# Patient Record
Sex: FEMALE | Race: WHITE | NOT HISPANIC OR LATINO | URBAN - METROPOLITAN AREA
[De-identification: names, ages, dates, MRNs, and addresses within clinical notes are randomized per-mention and may not be internally consistent; named-entity substitution may affect disease eponyms.]

---

## 2020-09-22 ENCOUNTER — APPOINTMENT (RX ONLY)
Dept: URBAN - METROPOLITAN AREA CLINIC 43 | Facility: CLINIC | Age: 65
Setting detail: DERMATOLOGY
End: 2020-09-22

## 2020-09-22 DIAGNOSIS — Z85.828 PERSONAL HISTORY OF OTHER MALIGNANT NEOPLASM OF SKIN: ICD-10-CM | Status: STABLE

## 2020-09-22 DIAGNOSIS — Z85.820 PERSONAL HISTORY OF MALIGNANT MELANOMA OF SKIN: ICD-10-CM

## 2020-09-22 DIAGNOSIS — L72.8 OTHER FOLLICULAR CYSTS OF THE SKIN AND SUBCUTANEOUS TISSUE: ICD-10-CM

## 2020-09-22 DIAGNOSIS — L56.5 DISSEMINATED SUPERFICIAL ACTINIC POROKERATOSIS (DSAP): ICD-10-CM

## 2020-09-22 DIAGNOSIS — L82.0 INFLAMED SEBORRHEIC KERATOSIS: ICD-10-CM

## 2020-09-22 PROBLEM — D48.5 NEOPLASM OF UNCERTAIN BEHAVIOR OF SKIN: Status: ACTIVE | Noted: 2020-09-22

## 2020-09-22 PROBLEM — D39.8 NEOPLASM OF UNCERTAIN BEHAVIOR OF OTHER SPECIFIED FEMALE GENITAL ORGANS: Status: ACTIVE | Noted: 2020-09-22

## 2020-09-22 PROBLEM — L30.9 DERMATITIS, UNSPECIFIED: Status: ACTIVE | Noted: 2020-09-22

## 2020-09-22 PROCEDURE — 11102 TANGNTL BX SKIN SINGLE LES: CPT | Mod: 59

## 2020-09-22 PROCEDURE — 99203 OFFICE O/P NEW LOW 30 MIN: CPT | Mod: 25

## 2020-09-22 PROCEDURE — ? BIOPSY BY SHAVE METHOD

## 2020-09-22 PROCEDURE — 56605 BIOPSY OF VULVA/PERINEUM: CPT

## 2020-09-22 PROCEDURE — ? TREATMENT REGIMEN

## 2020-09-22 PROCEDURE — 11103 TANGNTL BX SKIN EA SEP/ADDL: CPT | Mod: 59

## 2020-09-22 PROCEDURE — ? COUNSELING

## 2020-09-22 ASSESSMENT — LOCATION SIMPLE DESCRIPTION DERM
LOCATION SIMPLE: RIGHT FOREARM
LOCATION SIMPLE: NOSE
LOCATION SIMPLE: LEFT PRETIBIAL REGION
LOCATION SIMPLE: RIGHT FOREHEAD
LOCATION SIMPLE: LEFT FOREARM
LOCATION SIMPLE: LEFT ZYGOMA
LOCATION SIMPLE: RIGHT PRETIBIAL REGION

## 2020-09-22 ASSESSMENT — LOCATION DETAILED DESCRIPTION DERM
LOCATION DETAILED: LEFT DISTAL PRETIBIAL REGION
LOCATION DETAILED: RIGHT FOREHEAD
LOCATION DETAILED: NASAL TIP
LOCATION DETAILED: LEFT CENTRAL ZYGOMA
LOCATION DETAILED: RIGHT PROXIMAL DORSAL FOREARM
LOCATION DETAILED: RIGHT DISTAL PRETIBIAL REGION
LOCATION DETAILED: LEFT PROXIMAL DORSAL FOREARM

## 2020-09-22 ASSESSMENT — LOCATION ZONE DERM
LOCATION ZONE: LEG
LOCATION ZONE: FACE
LOCATION ZONE: NOSE
LOCATION ZONE: ARM

## 2020-09-22 NOTE — PROCEDURE: TREATMENT REGIMEN
Detail Level: Zone
Plan: Contact office if you would like a prescription for the compounded cream to treat these areas
Plan: Inflamed cyst versus enlarged lymph node \\nWill obtain ultrasound (paper order slip for U/S given to patient in clinic today)\\n
Plan: History of invasive melanoma; depth & stage unknown \\nTreated with left labia minora surgical excision \\nNow has cystic mass of left inguinal crease - ultrasound ordered as noted above \\nNew pigmentation on right labia biopsied today\\nEncouraged annual follow up with full skin/genital examination
Plan: history of morpheaform bcc nasal tip, treated with Mohs in 2014\\nno evidence of persistence/recurrence on exam today\\ncontinue annual checks \\n

## 2020-09-22 NOTE — PROCEDURE: BIOPSY BY SHAVE METHOD
Detail Level: Detailed
Depth Of Biopsy: dermis
Was A Bandage Applied: Yes
Size Of Lesion In Cm: 0
Accession #: [id number]
Biopsy Type: H and E
Biopsy Method: double edge Personna blade
Anesthesia Type: 1% lidocaine with epinephrine
Anesthesia Volume In Cc: 0.5
Hemostasis: Electrocautery
Wound Care: Petrolatum
Dressing: bandage
Destruction After The Procedure: No
Type Of Destruction Used: Electrodesiccation and Curettage
Curettage Text: The wound bed was treated with curettage after the biopsy was performed.
Cryotherapy Text: The wound bed was treated with cryotherapy after the biopsy was performed.
Electrodesiccation Text: The wound bed was treated with electrodesiccation after the biopsy was performed.
Electrodesiccation And Curettage Text: The wound bed was treated with electrodesiccation and curettage after the biopsy was performed.
Silver Nitrate Text: The wound bed was treated with silver nitrate after the biopsy was performed.
Lab: [id number]
Lab Facility: [id number]
Consent: Written consent was obtained and risks were reviewed including but not limited to scarring, infection, bleeding, scabbing, incomplete removal, nerve damage and allergy to anesthesia.
Post-Care Instructions: I reviewed with the patient in detail post-care instructions. Patient is to keep the biopsy site dry overnight, and then apply vaseline ointment twice daily until healed. Patient may apply hydrogen peroxide soaks to remove any crusting.
Notification Instructions: Patient will be notified of biopsy results. However, patient instructed to call the office if not contacted within 2 weeks.
Billing Type: Third-Party Bill
Information: Selecting Yes will display possible errors in your note based on the variables you have selected. This validation is only offered as a suggestion for you. PLEASE NOTE THAT THE VALIDATION TEXT WILL BE REMOVED WHEN YOU FINALIZE YOUR NOTE. IF YOU WANT TO FAX A PRELIMINARY NOTE YOU WILL NEED TO TOGGLE THIS TO 'NO' IF YOU DO NOT WANT IT IN YOUR FAXED NOTE.
Body Location Override (Optional - Billing Will Still Be Based On Selected Body Map Location If Applicable): right labia minora
Accession #: [id number]
Biopsy Method: scissors
Hemostasis: Electrodesiccation
Accession #: [id number]

## 2020-09-22 NOTE — HPI: EVALUATION OF SKIN LESION(S)
What Type Of Note Output Would You Prefer (Optional)?: Standard Output
How Severe Are Your Spot(S)?: mild
Have Your Spot(S) Been Treated In The Past?: has not been treated
Hpi Title: Evaluation of Skin Lesions
Additional History: TBSE ,  2 spots of concern R forehead left temple area.
Family Member: Mother
Location: R arm and vulva
Year Removed: 2006/2007

## 2021-09-17 ENCOUNTER — APPOINTMENT (RX ONLY)
Dept: URBAN - METROPOLITAN AREA CLINIC 43 | Facility: CLINIC | Age: 66
Setting detail: DERMATOLOGY
End: 2021-09-17

## 2021-09-17 DIAGNOSIS — L57.8 OTHER SKIN CHANGES DUE TO CHRONIC EXPOSURE TO NONIONIZING RADIATION: ICD-10-CM

## 2021-09-17 DIAGNOSIS — L82.0 INFLAMED SEBORRHEIC KERATOSIS: ICD-10-CM

## 2021-09-17 DIAGNOSIS — Z85.820 PERSONAL HISTORY OF MALIGNANT MELANOMA OF SKIN: ICD-10-CM

## 2021-09-17 PROCEDURE — ? COUNSELING

## 2021-09-17 PROCEDURE — 11102 TANGNTL BX SKIN SINGLE LES: CPT

## 2021-09-17 PROCEDURE — 11103 TANGNTL BX SKIN EA SEP/ADDL: CPT

## 2021-09-17 PROCEDURE — ? TREATMENT REGIMEN

## 2021-09-17 PROCEDURE — 99212 OFFICE O/P EST SF 10 MIN: CPT | Mod: 25

## 2021-09-17 PROCEDURE — ? BIOPSY BY SHAVE METHOD

## 2021-09-17 ASSESSMENT — LOCATION SIMPLE DESCRIPTION DERM
LOCATION SIMPLE: ABDOMEN
LOCATION SIMPLE: LEFT THIGH

## 2021-09-17 ASSESSMENT — LOCATION ZONE DERM
LOCATION ZONE: TRUNK
LOCATION ZONE: LEG

## 2021-09-17 ASSESSMENT — LOCATION DETAILED DESCRIPTION DERM
LOCATION DETAILED: LEFT ANTERIOR PROXIMAL THIGH
LOCATION DETAILED: LEFT RIB CAGE

## 2021-09-17 NOTE — PROCEDURE: TREATMENT REGIMEN
Detail Level: Zone
Plan: History of vulvar melanoma x 2\\n- left vulvar melanoma depth, stage unknown, year unknown, s/p left vulvectomy \\n- right vulvar melanoma 1.3mm pT2 9/2020; treated with vulvectomy and required multiple 2 subsequent surgeries to clear +MIS at margins \\n- Dr. Sharpe closely following right vulvar area \\n\\nDiscussed follow up examination schedule; she prefers to follow frequently (we discussed that q3mo checks are advised at the minimum for the first 2 years following melanoma diagnosis) with Dr. Sharpe and see me for a full skin check annually\\nScheduled her for 9/2022 \\nEncouraged her to let me know if she has any new/changing lesions of concern

## 2021-09-17 NOTE — PROCEDURE: BIOPSY BY SHAVE METHOD
Detail Level: Detailed
Depth Of Biopsy: dermis
Was A Bandage Applied: Yes
Size Of Lesion In Cm: 0
Accession #: [id number]
Biopsy Type: H and E
Biopsy Method: Personna blade
Anesthesia Type: 1% lidocaine without epinephrine
Anesthesia Volume In Cc: 0.5
Hemostasis: Electrocautery
Wound Care: Petrolatum
Dressing: bandage
Destruction After The Procedure: No
Type Of Destruction Used: Electrodesiccation and Curettage
Curettage Text: The wound bed was treated with curettage after the biopsy was performed.
Cryotherapy Text: The wound bed was treated with cryotherapy after the biopsy was performed.
Electrodesiccation Text: The wound bed was treated with electrodesiccation after the biopsy was performed.
Electrodesiccation And Curettage Text: The wound bed was treated with electrodesiccation and curettage after the biopsy was performed.
Silver Nitrate Text: The wound bed was treated with silver nitrate after the biopsy was performed.
Lab Facility: [id number]
Consent: Written consent was obtained and risks were reviewed including but not limited to scarring, infection, bleeding, scabbing, incomplete removal, nerve damage and allergy to anesthesia.
Post-Care Instructions: I reviewed with the patient in detail post-care instructions. Patient is to keep the biopsy site dry overnight, and then apply vaseline ointment twice daily until healed. Patient may apply hydrogen peroxide soaks to remove any crusting.
Notification Instructions: Patient will be notified of biopsy results. However, patient instructed to call the office if not contacted within 2 weeks.
Billing Type: Third-Party Bill
Information: Selecting Yes will display possible errors in your note based on the variables you have selected. This validation is only offered as a suggestion for you. PLEASE NOTE THAT THE VALIDATION TEXT WILL BE REMOVED WHEN YOU FINALIZE YOUR NOTE. IF YOU WANT TO FAX A PRELIMINARY NOTE YOU WILL NEED TO TOGGLE THIS TO 'NO' IF YOU DO NOT WANT IT IN YOUR FAXED NOTE.
Accession #: [id number]

## 2021-09-17 NOTE — HPI: EVALUATION OF SKIN LESION(S)
What Type Of Note Output Would You Prefer (Optional)?: Standard Output
How Severe Are Your Spot(S)?: mild
Have Your Spot(S) Been Treated In The Past?: has not been treated
Hpi Title: Evaluation of Skin Lesions
Additional History: Patient is here for TBSE. She is concerned with a lesion on her L upper thigh and under her L breast.
Family Member: Mother

## 2022-09-16 ENCOUNTER — APPOINTMENT (RX ONLY)
Dept: URBAN - METROPOLITAN AREA CLINIC 43 | Facility: CLINIC | Age: 67
Setting detail: DERMATOLOGY
End: 2022-09-16

## 2022-09-16 DIAGNOSIS — Q82.8 OTHER SPECIFIED CONGENITAL MALFORMATIONS OF SKIN: ICD-10-CM

## 2022-09-16 DIAGNOSIS — Z85.820 PERSONAL HISTORY OF MALIGNANT MELANOMA OF SKIN: ICD-10-CM

## 2022-09-16 DIAGNOSIS — L82.1 OTHER SEBORRHEIC KERATOSIS: ICD-10-CM

## 2022-09-16 PROCEDURE — ? COUNSELING

## 2022-09-16 PROCEDURE — ? TREATMENT REGIMEN

## 2022-09-16 PROCEDURE — 99213 OFFICE O/P EST LOW 20 MIN: CPT

## 2022-09-16 ASSESSMENT — LOCATION ZONE DERM
LOCATION ZONE: FACE
LOCATION ZONE: LEG

## 2022-09-16 ASSESSMENT — LOCATION DETAILED DESCRIPTION DERM
LOCATION DETAILED: RIGHT DISTAL PRETIBIAL REGION
LOCATION DETAILED: LEFT INFERIOR CENTRAL MALAR CHEEK

## 2022-09-16 ASSESSMENT — LOCATION SIMPLE DESCRIPTION DERM
LOCATION SIMPLE: RIGHT PRETIBIAL REGION
LOCATION SIMPLE: LEFT CHEEK

## 2022-09-16 NOTE — HPI: EVALUATION OF SKIN LESION(S)
What Type Of Note Output Would You Prefer (Optional)?: Standard Output
How Severe Are Your Spot(S)?: moderate
Have Your Spot(S) Been Treated In The Past?: has not been treated
Hpi Title: Evaluation of Skin Lesions
Location: Vulva & R arm
Year Removed: 2020 & 2021

## 2022-09-16 NOTE — PROCEDURE: COUNSELING
Detail Level: Detailed
Detail Level: Simple
Patient Specific Counseling (Will Not Stick From Patient To Patient): has many flat SKS on arms, legs; reassured benign.  Has a larger plaque of SK on the left cheek, treated today with a test application of LN2 and expected reaction discussed. If unable to satisfactorily destroy with LN2 then would reschedule back for cosmetic electrodessication.

## 2022-09-16 NOTE — PROCEDURE: TREATMENT REGIMEN
Detail Level: Zone
Plan: History of vulvar melanoma x 2\\n- left vulvar melanoma depth, stage unknown, year unknown, s/p left vulvectomy \\n- right vulvar melanoma 1.3mm pT2 9/2020; treated with vulvectomy and required multiple 2 subsequent surgeries to clear +MIS at margins \\n- Dr. Sharpe closely following right vulvar area - has next appointment with her next week \\n- Continue full skin exams annually

## 2023-09-15 ENCOUNTER — APPOINTMENT (RX ONLY)
Dept: URBAN - METROPOLITAN AREA CLINIC 43 | Facility: CLINIC | Age: 68
Setting detail: DERMATOLOGY
End: 2023-09-15

## 2023-09-15 DIAGNOSIS — I78.8 OTHER DISEASES OF CAPILLARIES: ICD-10-CM

## 2023-09-15 DIAGNOSIS — Z85.820 PERSONAL HISTORY OF MALIGNANT MELANOMA OF SKIN: ICD-10-CM

## 2023-09-15 DIAGNOSIS — L82.1 OTHER SEBORRHEIC KERATOSIS: ICD-10-CM

## 2023-09-15 PROBLEM — D23.39 OTHER BENIGN NEOPLASM OF SKIN OF OTHER PARTS OF FACE: Status: ACTIVE | Noted: 2023-09-15

## 2023-09-15 PROCEDURE — ? COUNSELING

## 2023-09-15 PROCEDURE — ? TREATMENT REGIMEN

## 2023-09-15 PROCEDURE — 99213 OFFICE O/P EST LOW 20 MIN: CPT

## 2023-09-15 ASSESSMENT — LOCATION SIMPLE DESCRIPTION DERM
LOCATION SIMPLE: NOSE
LOCATION SIMPLE: LEFT ZYGOMA

## 2023-09-15 ASSESSMENT — LOCATION DETAILED DESCRIPTION DERM
LOCATION DETAILED: NASAL SUPRATIP
LOCATION DETAILED: LEFT CENTRAL ZYGOMA

## 2023-09-15 ASSESSMENT — LOCATION ZONE DERM
LOCATION ZONE: NOSE
LOCATION ZONE: FACE

## 2023-09-15 NOTE — PROCEDURE: COUNSELING
Detail Level: Detailed
Patient Specific Counseling (Will Not Stick From Patient To Patient): Small benign angiofibroma on nose; reassured benign
Detail Level: Simple
Patient Specific Counseling (Will Not Stick From Patient To Patient): This was her other lesion of concern today; reassured benign

## 2023-09-15 NOTE — PROCEDURE: TREATMENT REGIMEN
Detail Level: Zone
Plan: History of vulvar melanoma x 3 \\n- left vulvar melanoma depth, stage unknown, year unknown, s/p left vulvectomy \\n- right vulvar melanoma 1.3mm pT2 9/2020; treated with vulvectomy and required multiple 2 subsequent surgeries to clear +MIS at margins \\n- now with recent right vulvar melanoma, 0.3mm depth excised and then re-excised 8/2023 \\n- has small pigmented macule adjacent to right clitoral tyson visible today; Dr. Sharpe continuing to follow closely \\n- discussed genetic testing to identify any potential tumor predisposition genes that could increase her risk for other types of cancer (such as pancreatic or renal cancer) and she prefers to hold off at this point \\n- Continue full skin exams annually
Detail Level: Detailed
Plan: Has many red telangiectasias on face- mainly nose, chin and medial cheeks\\nInterested in laser treatment - will have her follow up with Vaishali about cosmetic IPL

## 2024-09-16 ENCOUNTER — APPOINTMENT (RX ONLY)
Dept: URBAN - METROPOLITAN AREA CLINIC 43 | Facility: CLINIC | Age: 69
Setting detail: DERMATOLOGY
End: 2024-09-16

## 2024-09-16 DIAGNOSIS — Z85.828 PERSONAL HISTORY OF OTHER MALIGNANT NEOPLASM OF SKIN: ICD-10-CM

## 2024-09-16 DIAGNOSIS — Z85.820 PERSONAL HISTORY OF MALIGNANT MELANOMA OF SKIN: ICD-10-CM

## 2024-09-16 DIAGNOSIS — L56.5 DISSEMINATED SUPERFICIAL ACTINIC POROKERATOSIS (DSAP): ICD-10-CM | Status: INADEQUATELY CONTROLLED

## 2024-09-16 PROBLEM — D48.5 NEOPLASM OF UNCERTAIN BEHAVIOR OF SKIN: Status: ACTIVE | Noted: 2024-09-16

## 2024-09-16 PROCEDURE — ? PRESCRIPTION

## 2024-09-16 PROCEDURE — ? TREATMENT REGIMEN

## 2024-09-16 PROCEDURE — 99214 OFFICE O/P EST MOD 30 MIN: CPT | Mod: 25

## 2024-09-16 PROCEDURE — ? BIOPSY BY SHAVE METHOD

## 2024-09-16 PROCEDURE — ? COUNSELING

## 2024-09-16 PROCEDURE — 11102 TANGNTL BX SKIN SINGLE LES: CPT

## 2024-09-16 ASSESSMENT — LOCATION DETAILED DESCRIPTION DERM
LOCATION DETAILED: LEFT DISTAL PRETIBIAL REGION
LOCATION DETAILED: RIGHT DISTAL PRETIBIAL REGION
LOCATION DETAILED: NASAL TIP
LOCATION DETAILED: RIGHT LATERAL PROXIMAL PRETIBIAL REGION

## 2024-09-16 ASSESSMENT — LOCATION ZONE DERM
LOCATION ZONE: LEG
LOCATION ZONE: NOSE

## 2024-09-16 ASSESSMENT — LOCATION SIMPLE DESCRIPTION DERM
LOCATION SIMPLE: NOSE
LOCATION SIMPLE: LEFT PRETIBIAL REGION
LOCATION SIMPLE: RIGHT PRETIBIAL REGION

## 2024-09-16 NOTE — PROCEDURE: BIOPSY BY SHAVE METHOD
Detail Level: Detailed
Depth Of Biopsy: dermis
Was A Bandage Applied: Yes
Size Of Lesion In Cm: 0
Accession #: [id number]
Biopsy Type: H and E
Biopsy Method: Dermablade
Anesthesia Type: 1% Xylocaine with [id number] epinephrine and sodium bicarbonate
Anesthesia Volume In Cc: 0.6
Hemostasis: Electrocautery
Wound Care: Petrolatum
Dressing: Band-Aid
Destruction After The Procedure: No
Type Of Destruction Used: Electrodesiccation and Curettage
Curettage Text: The wound bed was treated with curettage after the biopsy was performed.
Cryotherapy Text: The wound bed was treated with cryotherapy after the biopsy was performed.
Electrodesiccation Text: The wound bed was treated with electrodesiccation after the biopsy was performed.
Electrodesiccation And Curettage Text: The wound bed was treated with electrodesiccation and curettage after the biopsy was performed.
Silver Nitrate Text: The wound bed was treated with silver nitrate after the biopsy was performed.
Lab: [id number]
Consent: Written consent was obtained and risks were reviewed including but not limited to scarring, infection, bleeding, scabbing, incomplete removal, nerve damage and allergy to anesthesia.
Post-Care Instructions: I reviewed with the patient in detail post-care instructions. Patient is to keep the biopsy site dry overnight, and then apply vaseline ointment twice daily until healed. Patient may apply hydrogen peroxide soaks to remove any crusting.
Notification Instructions: Patient will be notified of biopsy results. However, patient instructed to call the office if not contacted within 2 weeks.
Billing Type: Third-Party Bill
Information: Selecting Yes will display possible errors in your note based on the variables you have selected. This validation is only offered as a suggestion for you. PLEASE NOTE THAT THE VALIDATION TEXT WILL BE REMOVED WHEN YOU FINALIZE YOUR NOTE. IF YOU WANT TO FAX A PRELIMINARY NOTE YOU WILL NEED TO TOGGLE THIS TO 'NO' IF YOU DO NOT WANT IT IN YOUR FAXED NOTE.

## 2024-09-16 NOTE — HPI: SKIN LESION
Is This A New Presentation, Or A Follow-Up?: Skin Lesion
What Type Of Note Output Would You Prefer (Optional)?: Standard Output
How Severe Is Your Skin Lesion?: mild
Has Your Skin Lesion Been Treated?: not been treated
Additional History: Pt is concerned about this lesion because it located near a previous Mohs site.

## 2024-09-16 NOTE — PROCEDURE: TREATMENT REGIMEN
Detail Level: Zone
Plan: History of vulvar melanoma x 3 \\n- left vulvar melanoma depth, stage unknown, year unknown, s/p left vulvectomy \\n- right vulvar melanoma 1.3mm pT2 9/2020; treated with vulvectomy and required multiple 2 subsequent surgeries to clear +MIS at margins \\n- now with recent right vulvar melanoma, 0.3mm depth excised and then re-excised 8/2023 \\n- has small pigmented macule adjacent to right clitoral tyson visible today, appears benign today and was present at her last visit; Dr. Sharpe continuing to follow as well \\n- low threshold to biopsy given her history \\n- we have discussed genetic testing to identify any potential tumor predisposition genes that could increase her risk for other types of cancer (such as pancreatic or renal cancer) and she prefers to avoid at this point \\n- Continue full skin exams annually
Plan: history of morpheaform bcc nasal tip, treated with Mohs in 2014\\nno evidence of persistence/recurrence on exam today\\ncontinue annual checks \\n
Detail Level: Detailed
Plan: Applied test dose of LN2 to selected larger DSAP lesion on right distal pretibial skin; expected reaction discussed\\nShe will let me know if she finds LN2 cryotherapy and/or the 2% cholesterol/2% lovastatin cream helpful

## 2025-05-13 ENCOUNTER — APPOINTMENT (OUTPATIENT)
Dept: URBAN - METROPOLITAN AREA CLINIC 43 | Facility: CLINIC | Age: 70
Setting detail: DERMATOLOGY
End: 2025-05-13

## 2025-05-13 DIAGNOSIS — Z85.828 PERSONAL HISTORY OF OTHER MALIGNANT NEOPLASM OF SKIN: ICD-10-CM

## 2025-05-13 DIAGNOSIS — L56.5 DISSEMINATED SUPERFICIAL ACTINIC POROKERATOSIS (DSAP): ICD-10-CM

## 2025-05-13 DIAGNOSIS — Z85.820 PERSONAL HISTORY OF MALIGNANT MELANOMA OF SKIN: ICD-10-CM

## 2025-05-13 PROCEDURE — 99213 OFFICE O/P EST LOW 20 MIN: CPT

## 2025-05-13 PROCEDURE — ? COUNSELING

## 2025-05-13 PROCEDURE — ? TREATMENT REGIMEN

## 2025-05-13 ASSESSMENT — LOCATION DETAILED DESCRIPTION DERM
LOCATION DETAILED: NASAL TIP
LOCATION DETAILED: RIGHT LATERAL PROXIMAL PRETIBIAL REGION
LOCATION DETAILED: RIGHT DISTAL PRETIBIAL REGION
LOCATION DETAILED: LEFT DISTAL PRETIBIAL REGION

## 2025-05-13 ASSESSMENT — LOCATION ZONE DERM
LOCATION ZONE: NOSE
LOCATION ZONE: LEG

## 2025-05-13 ASSESSMENT — LOCATION SIMPLE DESCRIPTION DERM
LOCATION SIMPLE: RIGHT PRETIBIAL REGION
LOCATION SIMPLE: NOSE
LOCATION SIMPLE: LEFT PRETIBIAL REGION

## 2025-05-13 NOTE — PROCEDURE: TREATMENT REGIMEN
Detail Level: Detailed
Plan: Pt attempted to treat with 2% cholesterol/2% lovastatin ointment but discontinued due to the texture being too sticky for her comfort
Detail Level: Zone
Plan: History of vulvar melanoma x 3 \\n- left vulvar melanoma depth, stage unknown, year unknown, s/p left vulvectomy \\n- right vulvar melanoma 1.3mm pT2 9/2020; treated with vulvectomy and required multiple 2 subsequent surgeries to clear +MIS at margins \\n- now with recent right vulvar melanoma, 0.3mm depth excised and then re-excised 8/2023 \\n- has small pigmented macule adjacent to right clitoral tyson visible today, appears benign today and was present at her last visit\\n- pt is still seeing Dr. Sharpe q 6 months; most recently seen in 4/2025\\n- low threshold to biopsy given her history \\n- we have discussed genetic testing to identify any potential tumor predisposition genes that could increase her risk for other types of cancer (such as pancreatic or renal cancer) and she prefers to avoid at this point \\n- Continue full skin exams annually
Plan: history of morpheaform bcc nasal tip, treated with Mohs in 2014\\nno evidence of persistence/recurrence on exam today\\ncontinue annual checks \\n